# Patient Record
Sex: FEMALE | Race: AMERICAN INDIAN OR ALASKA NATIVE | HISPANIC OR LATINO | Employment: UNEMPLOYED | URBAN - METROPOLITAN AREA
[De-identification: names, ages, dates, MRNs, and addresses within clinical notes are randomized per-mention and may not be internally consistent; named-entity substitution may affect disease eponyms.]

---

## 2019-09-15 LAB — HCV AB SER-ACNC: NEGATIVE

## 2022-06-07 ENCOUNTER — TELEPHONE (OUTPATIENT)
Dept: ADMINISTRATIVE | Facility: OTHER | Age: 67
End: 2022-06-07

## 2022-06-07 NOTE — TELEPHONE ENCOUNTER
----- Message from Saulo Weir MA sent at 6/7/2022  7:29 AM EDT -----  Regarding: hep c  06/07/22 7:29 AM    Hello, our patient Andrew Sellers has had Hepatitis C completed/performed  Please assist in updating the patient chart by pulling the Care Everywhere (CE) document  The date of service is 09/15/2019       Thank you,  Saulo Weir MA  [facility]

## 2022-06-07 NOTE — TELEPHONE ENCOUNTER
Upon review of the In Basket request we were able to locate, review, and update the patient chart as requested for Hepatitis C   Any additional questions or concerns should be emailed to the Practice Liaisons via [email or web address] email, please do not reply via In Basket      Thank you  Phoebe Brown MA

## 2022-07-18 ENCOUNTER — OFFICE VISIT (OUTPATIENT)
Dept: FAMILY MEDICINE CLINIC | Facility: CLINIC | Age: 67
End: 2022-07-18
Payer: MEDICARE

## 2022-07-18 VITALS
RESPIRATION RATE: 18 BRPM | TEMPERATURE: 96.9 F | SYSTOLIC BLOOD PRESSURE: 142 MMHG | HEIGHT: 66 IN | OXYGEN SATURATION: 100 % | HEART RATE: 48 BPM | DIASTOLIC BLOOD PRESSURE: 78 MMHG | WEIGHT: 111 LBS | BODY MASS INDEX: 17.84 KG/M2

## 2022-07-18 DIAGNOSIS — Z23 ENCOUNTER FOR IMMUNIZATION: ICD-10-CM

## 2022-07-18 DIAGNOSIS — Z12.31 ENCOUNTER FOR SCREENING MAMMOGRAM FOR MALIGNANT NEOPLASM OF BREAST: ICD-10-CM

## 2022-07-18 DIAGNOSIS — F02.80 ALZHEIMER'S DEMENTIA WITHOUT BEHAVIORAL DISTURBANCE, UNSPECIFIED TIMING OF DEMENTIA ONSET: ICD-10-CM

## 2022-07-18 DIAGNOSIS — Z71.3 ENCOUNTER FOR WEIGHT LOSS COUNSELING: ICD-10-CM

## 2022-07-18 DIAGNOSIS — Z23 NEED FOR PNEUMOCOCCAL VACCINATION: ICD-10-CM

## 2022-07-18 DIAGNOSIS — Z11.4 SCREENING FOR HUMAN IMMUNODEFICIENCY VIRUS: ICD-10-CM

## 2022-07-18 DIAGNOSIS — Z76.89 ENCOUNTER TO ESTABLISH CARE: Primary | ICD-10-CM

## 2022-07-18 DIAGNOSIS — G30.9 ALZHEIMER'S DEMENTIA WITHOUT BEHAVIORAL DISTURBANCE, UNSPECIFIED TIMING OF DEMENTIA ONSET: ICD-10-CM

## 2022-07-18 DIAGNOSIS — F20.9 SCHIZOPHRENIA, UNSPECIFIED TYPE (HCC): ICD-10-CM

## 2022-07-18 DIAGNOSIS — E44.1 MILD PROTEIN-CALORIE MALNUTRITION (HCC): ICD-10-CM

## 2022-07-18 DIAGNOSIS — Z11.59 NEED FOR HEPATITIS C SCREENING TEST: ICD-10-CM

## 2022-07-18 DIAGNOSIS — R03.0 ELEVATED BP WITHOUT DIAGNOSIS OF HYPERTENSION: ICD-10-CM

## 2022-07-18 PROBLEM — F20.3 UNDIFFERENTIATED SCHIZOPHRENIA (HCC): Status: ACTIVE | Noted: 2019-09-14

## 2022-07-18 PROBLEM — F29 PSYCHOSIS (HCC): Status: ACTIVE | Noted: 2022-07-18

## 2022-07-18 PROCEDURE — 99204 OFFICE O/P NEW MOD 45 MIN: CPT | Performed by: STUDENT IN AN ORGANIZED HEALTH CARE EDUCATION/TRAINING PROGRAM

## 2022-07-18 PROCEDURE — 90677 PCV20 VACCINE IM: CPT

## 2022-07-18 PROCEDURE — G0009 ADMIN PNEUMOCOCCAL VACCINE: HCPCS

## 2022-07-18 NOTE — PROGRESS NOTES
Assessment/Plan/Follow up information       Diagnosis ICD-10-CM Associated Orders   1  Encounter to establish care  Z76 89 DXA bone density spine hip and pelvis   2  Encounter for screening mammogram for malignant neoplasm of breast  Z12 31 Mammo screening bilateral w 3d & cad   3  Screening for human immunodeficiency virus  Z11 4 HIV 1/2 Antigen/Antibody (4th Generation) w Reflex [facility]   4  Need for hepatitis C screening test  Z11 59 Hepatitis C antibody   5  Encounter for immunization  Z23 TSH, 3rd generation with Free T4 reflex     Comprehensive metabolic panel     CBC and differential     Lipid Panel with Direct LDL reflex     Vitamin B12   6  Elevated BP without diagnosis of hypertension  R03 0    7  [facility]  F20 9 TSH, 3rd generation with Free T4 reflex     Comprehensive metabolic panel     CBC and differential     Lipid Panel with Direct LDL reflex     Vitamin B12     Ambulatory Referral to Psychiatry   8  Mild protein-calorie malnutrition (HCC)  E44 1    9  Alzheimer's dementia without behavioral disturbance, unspecified timing of dementia onset ([facility] )  G30 9     F02 80    10  Need for pneumococcal vaccination  Z23 Pneumococcal Conjugate Vaccine 20-valent (Pcv20)   11  Encounter for weight loss counseling  Z71 3         Discussed with daughter that we need to attain records from previous provider before initiating treatment for schizophrenia  In the meantime new referral placed to psych and will order labs to r/o underlying causes of mental change  Unable to perform MMSE at this time secondary to language barrier, and patient inattentiveness  PHQ 9 negative for depression  I do not believe she is a harm to herself or other at this time  Pt did have elevated BP in office today, this is a single elevated reading will recheck in 1 week before initing treatment/diagnosis     Pt also due for mammo, DEXA, Pneumo 20 screen which she is agreeable to getting today    Will defer colonoscopy given age and other complex medical issues going on will readdress in near future  Pt also needs Medicare AWV in near future       Patient in agreement with the plan, all questions and concerns were answered/addressed  Advised to contact me or the office with any concerns or questions  In the event of an emergency, and unable to contact a provider they are to go to the emergency room  Subjective    HPI:  This is a 74 yof who presents today for establishment of care  Patient is accompanied by daughter who is primary history provider  States pt has history of schizophrenia, dementia, htn was previously living alone, but now daughter is caregiver  Unfortunately daughter is unsure what pts medication regiment was and states patient is currently not taking any med's  Daughter endorses seeing patient having auditory hallucinations, but does not act on them  States patient has never attempted self harm, and is under constantly supervision by herself  Also removes a history of dementia and is unsure if this may be worsening the patients current mental state  Denies any surgical history, or any psych hospitalization  Denies having any recent blood work     BMI Counseling: Body mass index is 17 92 kg/m²  The BMI is below normal  Dietary education for weight gain was provided to the patient today  Review of Systems   Constitutional: Negative for activity change, appetite change, chills, fatigue and fever  HENT: Negative for congestion, dental problem, drooling, ear discharge, ear pain, facial swelling, postnasal drip, rhinorrhea and sinus pain  Eyes: Negative for photophobia, pain, discharge and itching  Respiratory: Negative for apnea, cough, chest tightness and shortness of breath  Cardiovascular: Negative for chest pain and leg swelling  Gastrointestinal: Negative for abdominal distention, abdominal pain, anal bleeding, constipation, diarrhea and nausea     Endocrine: Negative for cold intolerance, heat intolerance and polydipsia  Genitourinary: Negative for difficulty urinating  Musculoskeletal: Negative for arthralgias, gait problem, joint swelling and myalgias  Skin: Negative for color change and pallor  Allergic/Immunologic: Negative for immunocompromised state  Neurological: Negative for dizziness, seizures, facial asymmetry, weakness, light-headedness, numbness and headaches  Psychiatric/Behavioral: Positive for behavioral problems, confusion and hallucinations  Negative for agitation, decreased concentration, dysphoric mood and self-injury  The patient is not nervous/anxious and is not hyperactive  All other systems reviewed and are negative  Objective    Vitals:    07/18/22 0815   BP: 142/78   Pulse: (!) 48   Resp: 18   Temp: (!) 96 9 °F (36 1 °C)   SpO2: 100%         Physical Exam  Vitals and nursing note reviewed  Constitutional:       General: She is not in acute distress  Appearance: She is well-developed  HENT:      Head: Normocephalic and atraumatic  Eyes:      Conjunctiva/sclera: Conjunctivae normal       Pupils: Pupils are equal, round, and reactive to light  Cardiovascular:      Rate and Rhythm: Normal rate and regular rhythm  Heart sounds: Normal heart sounds  No murmur heard  No friction rub  Pulmonary:      Effort: Pulmonary effort is normal       Breath sounds: Normal breath sounds  Abdominal:      General: Bowel sounds are normal       Palpations: Abdomen is soft  Musculoskeletal:         General: Normal range of motion  Cervical back: Normal range of motion and neck supple  Skin:     General: Skin is warm  Capillary Refill: Capillary refill takes less than 2 seconds  Neurological:      Mental Status: She is alert and oriented to person, place, and time  Motor: No abnormal muscle tone  Coordination: Coordination normal    Psychiatric:         Attention and Perception: She is inattentive   She does not perceive auditory or visual hallucinations  Mood and Affect: Affect is labile and flat  Speech: She is communicative  Speech is not rapid and pressured, delayed, slurred or tangential          Behavior: Behavior is withdrawn  Behavior is not agitated  Thought Content: Thought content is not paranoid or delusional  Thought content does not include homicidal or suicidal ideation  Thought content does not include homicidal or suicidal plan  Portions of the record may have been created with voice recognition software  Occasional wrong word or "sound a like" substitutions may have occurred due to the inherent limitations of voice recognition software  Read the chart carefully and recognize, using context, where substitutions have occurred  Contact me with any questions         Romana Basurto MD 07/18/22

## 2022-08-18 ENCOUNTER — TELEPHONE (OUTPATIENT)
Dept: FAMILY MEDICINE CLINIC | Facility: CLINIC | Age: 67
End: 2022-08-18

## 2022-08-18 NOTE — TELEPHONE ENCOUNTER
Care Gap- please remove Dr Kimberly Zee as PCP  Confirmed with patient they have established care with a new PCP he is still in the [place] [id number] [facility]

## 2022-08-23 NOTE — TELEPHONE ENCOUNTER
08/23/22 9:04 AM    Hello  The new [facility] PCP will be added to the patient's chart when they arrive for their first appointment with the office  Thank you    Suly Donovan

## 2022-09-27 ENCOUNTER — TELEPHONE (OUTPATIENT)
Dept: PSYCHIATRY | Facility: CLINIC | Age: 67
End: 2022-09-27

## 2022-09-27 NOTE — TELEPHONE ENCOUNTER
called pt in regards to routine referral  LVM for pt to call intake dept to be placed on proper wait list

## 2022-10-05 NOTE — TELEPHONE ENCOUNTER
Called patient regarding routine referral to be placed on proper wait list  When calling phone number listed, it states it is no longer in service

## 2022-10-12 NOTE — TELEPHONE ENCOUNTER
Called patient regarding Routine referral  LVM advising to return call to intake @ [phone] to be placed on wait list